# Patient Record
(demographics unavailable — no encounter records)

---

## 2024-11-22 NOTE — REASON FOR VISIT
[Hypothyroidism] : hypothyroidism [Thyroid Cancer] : thyroid cancer [Other___] : [unfilled] [Follow - Up] : a follow-up visit

## 2024-12-01 NOTE — HISTORY OF PRESENT ILLNESS
[Home] : at home, [unfilled] , at the time of the visit. [Medical Office: ([facility])___] : at the medical office located in  [Verbal consent obtained from patient] : the patient, [unfilled] [FreeTextEntry1] : Ms. ROSEMARIE MATTHEW is a 49year old female Initially sent in a consult by Dr Petty 4 thyroid cancer operated on in 1999, had the surgery done by  and was the  of her endocrinologist . patient has had problems with diarrhea  ulcerative colitis status post colectomy 4 colon cancer operated in 2004, poor absorption of her levothyroxine   thyroglobulin level undetectable  has 23 and 12yrs old Boys  healthy  Last seen prior and/or for 5 months ago when her tirade as low as usual per patient and also her calcium was low  She has been following for her ulcerative colitis with Dr. Vasquez in [facility]  The patient her dose of level thyroxine was the same for the last 4 years  Denies history of kidney stones has been hypoparathyroid after her thyroid surgery and has been on a high dose of causing trial calcium and vitamin D supplements she reports compliance with her calcium and vitamin D and also with her thyroid supplement never tried liquid form

## 2024-12-01 NOTE — HISTORY OF PRESENT ILLNESS
[Home] : at home, [unfilled] , at the time of the visit. [Medical Office: ([facility])___] : at the medical office located in  [Verbal consent obtained from patient] : the patient, [unfilled] [FreeTextEntry1] : Ms. ROSEMARIE MATTHEW is a 49year old female Initially sent in a consult by Dr Petty 4 thyroid cancer operated on in 1999, had the surgery done by  and was the  of her endocrinologist . patient has had problems with diarrhea  ulcerative colitis status post colectomy 4 colon cancer operated in 2004, poor absorption of her levothyroxine   thyroglobulin level undetectable  has 23 and 12yrs old Boys  healthy  Last seen prior and/or for 5 months ago when her tirade as low as usual per patient and also her calcium was low  She has been following for her ulcerative colitis with Dr. Vasquez in [facility]  The patient her dose of level thyroxine was the same for the last 4 years  Denies history of kidney stones has been hypoparathyroid after her thyroid surgery and has been on a high dose of causing trial calcium and vitamin D supplements she reports compliance with her calcium and vitamin D and also with her thyroid supplement never tried liquid form

## 2024-12-01 NOTE — HISTORY OF PRESENT ILLNESS
[Home] : at home, [unfilled] , at the time of the visit. [Medical Office: ([facility])___] : at the medical office located in  [Verbal consent obtained from patient] : the patient, [unfilled] [FreeTextEntry1] : Ms. ROSEMARIE MATTHEW is a 49year old female Initially sent in a consult by Dr Petty 4 thyroid cancer operated on in 1999, had the surgery done by  and was the  of her endocrinologist . patient has had problems with diarrhea  ulcerative colitis status post colectomy 4 colon cancer operated in 2004, poor absorption of her levothyroxine   thyroglobulin level undetectable  has 23 and 12yrs old Boys  healthy  Last seen prior and/or for 5 months ago when her tirade as low as usual per patient and also her calcium was low  She has been following for her ulcerative colitis with Dr. Vasquez in [facility]  The patient her dose of level thyroxine was the same for the last 4 years  Denies history of kidney stones has been hypoparathyroid after her thyroid surgery and has been on a high dose of causing trial calcium and vitamin D supplements she reports compliance with her calcium and vitamin D and also with her thyroid supplement never tried liquid form

## 2024-12-01 NOTE — HISTORY OF PRESENT ILLNESS
[Home] : at home, [unfilled] , at the time of the visit. [Medical Office: ([facility])___] : at the medical office located in  [Verbal consent obtained from patient] : the patient, [unfilled] [FreeTextEntry1] : Ms. ROSEMARIE MATTHEW is a 49year old female Initially sent in a consult by Dr Petty 4 thyroid cancer operated on in 1999, had the surgery done by  and was the  of her endocrinologist . patient has had problems with diarrhea  ulcerative colitis status post colectomy 4 colon cancer operated in 2004, poor absorption of her levothyroxine   thyroglobulin level undetectable  has 23 and 12yrs old Boys  healthy  Last seen prior and/or for 5 months ago when her tirade as low as usual per patient and also her calcium was low  She has been following for her ulcerative colitis with Dr. Vasquez in [facility]  The patient her dose of level thyroxine was the same for the last 4 years  Denies history of kidney stones has been hypoparathyroid after her thyroid surgery and has been on a high dose of causing trial calcium and vitamin D supplements she reports compliance with her calcium and vitamin D and also with her thyroid supplement never tried liquid form

## 2025-03-13 NOTE — ASSESSMENT
[FreeTextEntry1] : 48 yo female- right axilla adenopathy c/w metastatic breast cancer. Molecular analysis done at [facility] 89% c/w breast cancer which corresponds with anatomical location. D/w patient findings - ER/ME/her2 vikki negative.  Genetic testing [facility] reviewed with patient CHANDRIKA NTHL1. April 2019  #S/p neoadjuvant chemo - ACdd >>T weekly, finished 9/2019 -Underwent b/l mastectomy with MARISA flap , Ischemia of the flaps with postop anemia, patient underwent blood -transfusion of uncrossed matched blood on 10/8/19.   -radiation completed for RT to axillary area - Weaned off Lexapro changed to Amatryptiline for GI issues and depression - undergoing reconstruction- s/p B/L capsulectomy- TE to silicone implant exchange of Feb 2022- needs reconstruction for capsular contracture- breast reconstruction revision with fat grafting Feb 28, 2023 - repeat iron stores   #Colitis - , no change baseline diarrhea - to follow up for colonoscopy- Done July 2024- rectal bleeding seeing GI next week  Hx of thyroid cancer- follows with Dr. Coelho- US head and soft tissue neck 11/21- no evidence of recurrence- follows with Dr. Coelho, apt in April

## 2025-03-13 NOTE — PHYSICAL EXAM
[Fully active, able to carry on all pre-disease performance without restriction] : Status 0 - Fully active, able to carry on all pre-disease performance without restriction [Normal] : affect appropriate [de-identified] : b/l mastectomy, removed MARISA flap  expander [de-identified] : nail changes, discoloration, Yohana's line.  right finger thumb, index finger.

## 2025-03-13 NOTE — PHYSICAL EXAM
[Fully active, able to carry on all pre-disease performance without restriction] : Status 0 - Fully active, able to carry on all pre-disease performance without restriction [Normal] : affect appropriate [de-identified] : b/l mastectomy, removed MARISA flap  expander [de-identified] : nail changes, discoloration, Yohana's line.  right finger thumb, index finger.

## 2025-03-13 NOTE — REVIEW OF SYSTEMS
[Fatigue] : fatigue [Insomnia] : insomnia [Anxiety] : anxiety [Depression] : depression [Hot Flashes] : hot flashes [Negative] : Allergic/Immunologic [Palpitations] : no palpitations [SOB on Exertion] : no shortness of breath during exertion [de-identified] : recent diagnosed ezcema; still has neuropahty

## 2025-03-13 NOTE — REVIEW OF SYSTEMS
[Fatigue] : fatigue [Insomnia] : insomnia [Anxiety] : anxiety [Depression] : depression [Hot Flashes] : hot flashes [Negative] : Allergic/Immunologic [Palpitations] : no palpitations [SOB on Exertion] : no shortness of breath during exertion [de-identified] : recent diagnosed ezcema; still has neuropahty

## 2025-03-13 NOTE — HISTORY OF PRESENT ILLNESS
[de-identified] : Patient underwent her yearly gynecological exam with Dr. Escobedo in which an abnormality was palpated on the right axilla- she was sent for mammogram.  Patient states this has been there for 3 years and she had a negative biopsy at [facility] in 2015. \par  \par  4/10/19 patient had a right axillary LN US guided biopsy\par  Final Diagnosis:\par  Right Axillary LNs, excisions:  Metastatic poorly differentiated carcinoma involving two LNs. No Extracapsular extension is identified.   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 The tumor cells are positive for: MOC31, SATB2, CK5/6, S100 (cytoplasmic), SM actin, EMA, CDX2, and Ki-67 stains more than 90%of the tumor nucleil\par  The tumor clels are negative for: ER, CO< HER-2, CK20, TTF-1, Napsin-A, MAURICIO-3, GCDFP-15, p63, CEA, and mammoglobin. \par  \par  Patient had a thyroidectomy in 2000 for thyroid cancer and radioactive iodine.  She also had a colectomy in 2004 for colon cancer-she has a j pouch.  Patient's mother has breast cancer and father has lung cancer.  \par  \par  s/p neoadjuvant chemo with ACdd>>T weekly x12 completed September 2019 \par  s/p b/l mastectomy with bilateral breast reconstruction with MARISA flaps, Procedure complicated by return to OR for left breast ischemia, and hemodynamic instability, given 6 units of pRBCs in the OR in 10/2019 \par  -path - Right breast, mastectomy: Non-proliferative fibrocystic change with sclerosing adenosis and areas of dense interlobular fibrosis. Microcalcifications associated with benign ducts. No evidence of in-situ or invasive carcinoma.\par  Internal mammary lymph node, right breast, excision: One (1) lymph node negative for metastatic carcinoma.\par  Left breast, mastectomy: Non-proliferative fibrocystic change with sclerosing adenosis and areas of dense interlobular fibrosis. Microcalcifications associated with benign ducts. No evidence of in-situ or invasive carcinoma. [de-identified] : Patient presents today for follow up of breast cancer. Patient overall feels well.  Still persistent hot flashes and insomnia.

## 2025-03-13 NOTE — ASSESSMENT
[FreeTextEntry1] : 48 yo female- right axilla adenopathy c/w metastatic breast cancer. Molecular analysis done at [facility] 89% c/w breast cancer which corresponds with anatomical location. D/w patient findings - ER/AL/her2 vikki negative.  Genetic testing [facility] reviewed with patient CHANDRIKA NTHL1. April 2019  #S/p neoadjuvant chemo - ACdd >>T weekly, finished 9/2019 -Underwent b/l mastectomy with MARISA flap , Ischemia of the flaps with postop anemia, patient underwent blood -transfusion of uncrossed matched blood on 10/8/19.   -radiation completed for RT to axillary area - Weaned off Lexapro changed to Amatryptiline for GI issues and depression - undergoing reconstruction- s/p B/L capsulectomy- TE to silicone implant exchange of Feb 2022- needs reconstruction for capsular contracture- breast reconstruction revision with fat grafting Feb 28, 2023 - repeat iron stores   #Colitis - , no change baseline diarrhea - to follow up for colonoscopy- Done July 2024- rectal bleeding seeing GI next week  Hx of thyroid cancer- follows with Dr. Coelho- US head and soft tissue neck 11/21- no evidence of recurrence- follows with Dr. Coelho, apt in April

## 2025-03-13 NOTE — REASON FOR VISIT
[Follow-Up Visit] : a follow-up [FreeTextEntry2] : Breast cancer  [FreeTextEntry1] : PAC accessed to [facility] as per policy. Positive blood return, positive flush, labs drawn. Pt tolerated well and left accessed for treatment.

## 2025-03-13 NOTE — DISCUSSION/SUMMARY
[FreeTextEntry1] : Spoke to patient- she has been tolerating Lexapro with no adverse effects- increased to 10 mg daily a week ago and tolerating well- she is feeling good and requesting a refill on 10 mg- sent to pharmacy. Pt has follow up in 2 weeks

## 2025-03-13 NOTE — HISTORY OF PRESENT ILLNESS
[de-identified] : Patient underwent her yearly gynecological exam with Dr. Escobedo in which an abnormality was palpated on the right axilla- she was sent for mammogram.  Patient states this has been there for 3 years and she had a negative biopsy at [facility] in 2015. \par  \par  4/10/19 patient had a right axillary LN US guided biopsy\par  Final Diagnosis:\par  Right Axillary LNs, excisions:  Metastatic poorly differentiated carcinoma involving two LNs. No Extracapsular extension is identified.   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 The tumor cells are positive for: MOC31, SATB2, CK5/6, S100 (cytoplasmic), SM actin, EMA, CDX2, and Ki-67 stains more than 90%of the tumor nucleil\par  The tumor clels are negative for: ER, NH< HER-2, CK20, TTF-1, Napsin-A, MAURICIO-3, GCDFP-15, p63, CEA, and mammoglobin. \par  \par  Patient had a thyroidectomy in 2000 for thyroid cancer and radioactive iodine.  She also had a colectomy in 2004 for colon cancer-she has a j pouch.  Patient's mother has breast cancer and father has lung cancer.  \par  \par  s/p neoadjuvant chemo with ACdd>>T weekly x12 completed September 2019 \par  s/p b/l mastectomy with bilateral breast reconstruction with MARISA flaps, Procedure complicated by return to OR for left breast ischemia, and hemodynamic instability, given 6 units of pRBCs in the OR in 10/2019 \par  -path - Right breast, mastectomy: Non-proliferative fibrocystic change with sclerosing adenosis and areas of dense interlobular fibrosis. Microcalcifications associated with benign ducts. No evidence of in-situ or invasive carcinoma.\par  Internal mammary lymph node, right breast, excision: One (1) lymph node negative for metastatic carcinoma.\par  Left breast, mastectomy: Non-proliferative fibrocystic change with sclerosing adenosis and areas of dense interlobular fibrosis. Microcalcifications associated with benign ducts. No evidence of in-situ or invasive carcinoma. [de-identified] : Patient presents today for follow up of breast cancer. Patient overall feels well.  Still persistent hot flashes and insomnia.

## 2025-04-03 NOTE — HISTORY OF PRESENT ILLNESS
[FreeTextEntry1] : Ms. ROSEMARIE MATTHEW is a 49 year old female Initially sent in a consult by Dr Petty 4 thyroid cancer operated on in 1999, had the surgery done by  and was the  of her endocrinologist . patient has had problems with diarrhea  ulcerative colitis status post colectomy 4 colon cancer operated in 2004, poor absorption of her levothyroxine   thyroglobulin level undetectable  has 23 and 12yrs old Boys  healthy  Last seen prior and/or for 5 months ago when her tirade as low as usual per patient and also her calcium was low  She has been following for her ulcerative colitis with Dr. Vasquez in [facility]  The patient her dose of level thyroxine was the same for the last 4 years  Denies history of kidney stones has been hypoparathyroid after her thyroid surgery and has been on a high dose of causing trial calcium and vitamin D supplements she reports compliance with her calcium and vitamin D and also with her thyroid supplement never tried liquid form  04/03/25 Patient is okay. Says she is always tired. Complains she is always short of breath. Reports colitis is active right now.   Has appointment next week at [place] for breast surgeon bc they think the breast implant is shifting. Has neuropathy in arm.

## 2025-04-03 NOTE — END OF VISIT
[Time Spent: ___ minutes] : I have spent [unfilled] minutes of time on the encounter which excludes teaching and separately reported services. [FreeTextEntry3] :  I, Roshan Anglin, am scribing for and in the presence of Dr. Honey Coelho in the following sections: HISTORY OF PRESENT ILLNESS; REVIEW OF SYSTEMS; PHYSICAL EXAM; ASSESSMENT/ PLAN.I, Honey Coelho, personally performed the services described in the documentation, reviewed the documentation recorded by the scribe in my presence, and it accurately and completely records my words and actions. 04/03/25

## 2025-04-09 NOTE — REASON FOR VISIT
[Consultation] : a consultation visit [FreeTextEntry1] : Status post bilateral mastectomy for right breast cancer

## 2025-04-09 NOTE — PAST MEDICAL HISTORY
[Postmenopausal] : The patient is postmenopausal [Menarche Age ____] : age at menarche was [unfilled] [Menopause Age____] : age at menopause was [unfilled] [Definite ___ (Date)] : the last menstrual period was [unfilled] [Total Preg ___] : G[unfilled] [Live Births ___] : P[unfilled]  [Age At Live Birth ___] : Age at live birth: [unfilled] [History of Hormone Replacement Treatment] : has no history of hormone replacement treatment 2

## 2025-04-09 NOTE — HISTORY OF PRESENT ILLNESS
[FreeTextEntry1] : 49-year-old postmenopausal woman with a family history of breast cancer in 2019 presented with right axillary adenopathy and metastasis and underwent a bilateral mastectomy with Sherrell flap which showed no original primary breast cancer.  The cancer was a poorly differentiated carcinoma with medullary type features and before surgery she underwent neoadjuvant chemotherapy with postoperative radiation treatment for a triple negative breast cancer.  Genetic testing by [facility] showed a NTHL1 variant of unknown significance.  Patient comes in now for continued surveillance.  Patient had multiple problems with her flaps requiring excisions and revisions as well as tissue expanders and ultimately silicone implant exchanges.  Patient has been left with right arm pain and decreased range of motion.  Patient does not know if there is any fat grafting but in the record there seem to be some fat grafting in 2023.  Patient currently denies any breast masses or bone pain.  Patient's mother had breast cancer in her 70s and a paternal grandfather had pancreatic cancer.  Patient is also status post a colectomy for colorectal cancer presumably brought on by her ulcerative colitis which she also underwent a J-pouch.

## 2025-04-09 NOTE — REVIEW OF SYSTEMS
[Arthralgias] : arthralgias [Joint Stiffness] : joint stiffness [Limb Pain] : limb pain [Breast Pain] : breast pain [Breast Lump] : breast lump [Limb Weakness] : limb weakness [Negative] : Heme/Lymph

## 2025-04-09 NOTE — PHYSICAL EXAM
[No Supraclavicular Adenopathy] : no supraclavicular adenopathy [No Cervical Adenopathy] : no cervical adenopathy [Clear to Auscultation Bilat] : clear to auscultation bilaterally [Examined in the supine and seated position] : examined in the supine and seated position [Asymmetrical] : asymmetrical [No dominant masses] : no dominant masses in right breast  [No Axillary Lymphadenopathy] : no left axillary lymphadenopathy [No Rashes] : no rashes [de-identified] : Multiple bilateral scars throughout her chest with intact implants. [de-identified] : At the 12 o'clock position, 6 cm from the nipple in the subcutaneous position is a 8 mm nodule.